# Patient Record
Sex: FEMALE | Race: WHITE | NOT HISPANIC OR LATINO | Employment: FULL TIME | URBAN - NONMETROPOLITAN AREA
[De-identification: names, ages, dates, MRNs, and addresses within clinical notes are randomized per-mention and may not be internally consistent; named-entity substitution may affect disease eponyms.]

---

## 2023-05-22 NOTE — PROGRESS NOTES
Patient: Belen Cam    YOB: 1990    Date: 05/23/2023    Primary Care Provider: Deepa Crandall PA    Chief Complaint   Patient presents with   • Skin Lesion     Right ear, scalp x3       SUBJECTIVE:    History of present illness:  The patient is in the office today for evaluation and treatment of a right skin lesions that have minimally increased in size. Patient states that the lesions have been present for many years. They are non-tender. She states that she has skin lesion on her right ear and multple lesions on her scalp. They do not bleed.    The following portions of the patient's history were reviewed and updated as appropriate: allergies, current medications, past family history, past medical history, past social history, past surgical history and problem list.      Review of Systems   Constitutional: Negative for chills, fever and unexpected weight change.   HENT: Negative for hearing loss, trouble swallowing and voice change.    Eyes: Negative for visual disturbance.   Respiratory: Negative for apnea, cough, chest tightness, shortness of breath and wheezing.    Cardiovascular: Negative for chest pain, palpitations and leg swelling.   Gastrointestinal: Negative for abdominal distention, abdominal pain, anal bleeding, blood in stool, constipation, diarrhea, nausea, rectal pain and vomiting.   Endocrine: Negative for cold intolerance and heat intolerance.   Genitourinary: Negative for difficulty urinating, dysuria and flank pain.   Musculoskeletal: Negative for back pain and gait problem.   Skin: Positive for color change. Negative for rash and wound.   Neurological: Negative for dizziness, syncope, speech difficulty, weakness, light-headedness, numbness and headaches.   Hematological: Negative for adenopathy. Does not bruise/bleed easily.   Psychiatric/Behavioral: Negative for confusion. The patient is not nervous/anxious.        Allergies:  Allergies   Allergen Reactions   • Sulfa  "Antibiotics Unknown - Low Severity       Medications:    Current Outpatient Medications:   •  budesonide-formoterol (SYMBICORT) 80-4.5 MCG/ACT inhaler, Inhale 2 puffs 2 (Two) Times a Day., Disp: , Rfl:   •  hydrOXYzine (ATARAX) 25 MG tablet, , Disp: , Rfl:     History:  History reviewed. No pertinent past medical history.    History reviewed. No pertinent surgical history.    Family History   Problem Relation Age of Onset   • Hypertension Mother    • Hypertension Father        Social History     Tobacco Use   • Smoking status: Never   • Smokeless tobacco: Never   Vaping Use   • Vaping Use: Never used   Substance Use Topics   • Alcohol use: Defer   • Drug use: Defer        OBJECTIVE:    Vital Signs:   Vitals:    05/23/23 0903   BP: 138/81   Pulse: 68   Resp: 18   Temp: 98.1 °F (36.7 °C)   TempSrc: Oral   SpO2: 100%   Weight: 73.1 kg (161 lb 1.6 oz)   Height: 170.2 cm (67\")       Physical Exam:   General Appearance:    Alert, cooperative, in no acute distress   Head:    Normocephalic, without obvious abnormality, atraumatic   Eyes:            Lids and lashes normal, conjunctivae and sclerae normal, no   icterus, no pallor, corneas clear, PERRLA   Ears:    Ears appear intact with no abnormalities noted   Throat:   No oral lesions, no thrush, oral mucosa moist   Neck:   No adenopathy, supple, trachea midline, no thyromegaly, no   carotid bruit, no JVD   Lungs:     Clear to auscultation,respirations regular, even and                  unlabored    Heart:    Regular rhythm and normal rate, normal S1 and S2, no            murmur, no gallop, no rub, no click   Chest Wall:    No abnormalities observed   Abdomen:     Normal bowel sounds, no masses, no organomegaly, soft        non-tender, non-distended, no guarding, no rebound                tenderness   Extremities:   Moves all extremities well, no edema, no cyanosis, no             redness   Pulses:   Pulses palpable and equal bilaterally   Skin:   Right posterior auricular " irregular hypertrophic skin lesion/tag, similar appearing hypertrophic irregular scalp lesions- 1 right nape of neck, 2 on top of scalp   Lymph nodes:   No palpable adenopathy   Neurologic:   Cranial nerves 2 - 12 grossly intact, sensation intact, DTR       present and equal bilaterally     Results Review:   None    Review of Systems was reviewed and confirmed as accurate as documented by the MA.    ASSESSMENT/PLAN:    1. Scalp lesion      Patient was referred to me for multiple skin lesions that have been present for many years. Patient is hoping to have them removed for cosmetic reasons and for diagnosis. We discussed the procedure and the risks in office today. She has her children with her today and will return to office on 6/6/23 for removal. Patient is agreeable to the plan and all questions were answered.     I discussed the patients findings and my recommendations with patient      Electronically signed by Noemy Boateng DO  05/23/23

## 2023-05-23 ENCOUNTER — OFFICE VISIT (OUTPATIENT)
Dept: SURGERY | Facility: CLINIC | Age: 33
End: 2023-05-23
Payer: MEDICAID

## 2023-05-23 VITALS
TEMPERATURE: 98.1 F | HEIGHT: 67 IN | DIASTOLIC BLOOD PRESSURE: 81 MMHG | WEIGHT: 161.1 LBS | RESPIRATION RATE: 18 BRPM | HEART RATE: 68 BPM | OXYGEN SATURATION: 100 % | SYSTOLIC BLOOD PRESSURE: 138 MMHG | BODY MASS INDEX: 25.28 KG/M2

## 2023-05-23 DIAGNOSIS — L98.9 SCALP LESION: Primary | ICD-10-CM

## 2023-05-23 RX ORDER — BUDESONIDE AND FORMOTEROL FUMARATE DIHYDRATE 80; 4.5 UG/1; UG/1
2 AEROSOL RESPIRATORY (INHALATION)
COMMUNITY

## 2023-05-23 RX ORDER — HYDROXYZINE HYDROCHLORIDE 25 MG/1
TABLET, FILM COATED ORAL
COMMUNITY
Start: 2023-01-27

## 2023-05-31 NOTE — PROGRESS NOTES
Locations:  1) Left superior scalp lesion (1 cm)  2) Right superior scalp lesion (1 cm)  3) Right neck lesion (1 cm)  4) Right periauricular lesion (2 cm)  5) Right forearm lesion (2.1 cm)    Procedure: Lesion excision with primary closure of the aforementioned lesions    I recommend excision. Procedure and the risks and benefits were explained including bleeding and infection. The patient understands these and wishes to proceed.     The patient was brought to the procedure room. Consent and time out were performed. The areas were clipped, prepped, and draped in the usual fashion. 1% lidocaine with epinephrine was infused locally. An ellyptical incision was made around the lesion. Full thickness excision was performed. The same procedure was carried out for each lesion. The lesion measurements were as listed above. The scalp lesion incisions were closed with interrupted 4-0 monocryl suture and dressed with antibiotic ointment. The arm lesion incision was closed with interrupted 4-0 nylon suture. It was dressed with guaze and tape. There were no complications and the patient tolerated the procedure well. Hemostasis was well controlled with pressure and there was minimal blood loss. Wound instructions were given.

## 2023-06-06 ENCOUNTER — PROCEDURE VISIT (OUTPATIENT)
Dept: SURGERY | Facility: CLINIC | Age: 33
End: 2023-06-06
Payer: MEDICAID

## 2023-06-06 VITALS — BODY MASS INDEX: 25.29 KG/M2 | HEIGHT: 67 IN | WEIGHT: 161.16 LBS

## 2023-06-06 DIAGNOSIS — L98.9 ARM SKIN LESION, RIGHT: ICD-10-CM

## 2023-06-06 DIAGNOSIS — L98.9 SCALP LESION: Primary | ICD-10-CM

## 2023-06-07 LAB — REF LAB TEST METHOD: NORMAL

## 2023-06-08 ENCOUNTER — TELEPHONE (OUTPATIENT)
Dept: SURGERY | Facility: CLINIC | Age: 33
End: 2023-06-08
Payer: MEDICAID

## 2023-06-08 NOTE — TELEPHONE ENCOUNTER
"Pt is s/p excision skin lesion performed by Dr. Boateng, she called the office c/o \"bandage is stuck to incision.\"  I offer pt an appt with on call surgeon today, she declined and stated \"I will see my doctor down here where I live and see if they can get it off because I don't want to drive that far.\"  "

## 2024-06-12 ENCOUNTER — HOSPITAL ENCOUNTER (OUTPATIENT)
Dept: ULTRASOUND IMAGING | Facility: HOSPITAL | Age: 34
Discharge: HOME | End: 2024-06-12
Payer: COMMERCIAL

## 2024-06-12 DIAGNOSIS — N94.6 DYSMENORRHEA: ICD-10-CM

## 2024-06-12 PROCEDURE — 76830 TRANSVAGINAL US NON-OB: CPT
